# Patient Record
Sex: FEMALE | Race: WHITE | NOT HISPANIC OR LATINO | Employment: OTHER | URBAN - METROPOLITAN AREA
[De-identification: names, ages, dates, MRNs, and addresses within clinical notes are randomized per-mention and may not be internally consistent; named-entity substitution may affect disease eponyms.]

---

## 2018-07-05 ENCOUNTER — NEW PATIENT COMPREHENSIVE (OUTPATIENT)
Dept: URBAN - METROPOLITAN AREA CLINIC 36 | Facility: CLINIC | Age: 73
End: 2018-07-05

## 2018-07-05 DIAGNOSIS — H04.123: ICD-10-CM

## 2018-07-05 DIAGNOSIS — H26.491: ICD-10-CM

## 2018-07-05 PROCEDURE — G8427 DOCREV CUR MEDS BY ELIG CLIN: HCPCS

## 2018-07-05 PROCEDURE — 1036F TOBACCO NON-USER: CPT

## 2018-07-05 PROCEDURE — 99204 OFFICE O/P NEW MOD 45 MIN: CPT

## 2018-07-05 PROCEDURE — 4040F PNEUMOC VAC/ADMIN/RCVD: CPT

## 2018-07-05 PROCEDURE — G8482 FLU IMMUNIZE ORDER/ADMIN: HCPCS

## 2018-07-05 PROCEDURE — 92015 DETERMINE REFRACTIVE STATE: CPT

## 2018-07-05 ASSESSMENT — VISUAL ACUITY
OD_SC: J3
OS_CC: 20/50
OS_SC: 20/50
OS_CC: J3
OD_CC: J3
OD_CC: 20/40
OD_SC: 20/50+1
OS_SC: J3

## 2018-07-05 ASSESSMENT — TONOMETRY
OD_IOP_MMHG: 16
OS_IOP_MMHG: 18

## 2018-07-09 ENCOUNTER — RETINA CONSULT (OUTPATIENT)
Dept: URBAN - METROPOLITAN AREA CLINIC 36 | Facility: CLINIC | Age: 73
End: 2018-07-09

## 2018-07-09 VITALS — DIASTOLIC BLOOD PRESSURE: 78 MMHG | HEART RATE: 77 BPM | HEIGHT: 55 IN | SYSTOLIC BLOOD PRESSURE: 126 MMHG

## 2018-07-09 DIAGNOSIS — H35.363: ICD-10-CM

## 2018-07-09 DIAGNOSIS — H53.8: ICD-10-CM

## 2018-07-09 DIAGNOSIS — H35.52: ICD-10-CM

## 2018-07-09 DIAGNOSIS — H43.813: ICD-10-CM

## 2018-07-09 PROCEDURE — 9222550 BILAT EXTENDED OPHTHALMOSCOPY, FIRST

## 2018-07-09 PROCEDURE — 1036F TOBACCO NON-USER: CPT

## 2018-07-09 PROCEDURE — 92134 CPTRZ OPH DX IMG PST SGM RTA: CPT

## 2018-07-09 PROCEDURE — G8427 DOCREV CUR MEDS BY ELIG CLIN: HCPCS

## 2018-07-09 PROCEDURE — G8952 PRE-HTN/HTN, NO F/U, NOT GVN: HCPCS

## 2018-07-09 PROCEDURE — 92014 COMPRE OPH EXAM EST PT 1/>: CPT

## 2018-07-09 ASSESSMENT — TONOMETRY
OD_IOP_MMHG: 14
OS_IOP_MMHG: 18

## 2018-07-09 ASSESSMENT — VISUAL ACUITY
OD_SC: J3
OS_SC: J3
OS_CC: 20/40-2
OD_CC: 20/40+2

## 2018-07-18 ENCOUNTER — CONSULT (OUTPATIENT)
Dept: URBAN - METROPOLITAN AREA CLINIC 39 | Facility: CLINIC | Age: 73
End: 2018-07-18

## 2018-07-18 ENCOUNTER — SURGERY/PROCEDURE (OUTPATIENT)
Dept: URBAN - METROPOLITAN AREA SURGERY 14 | Facility: SURGERY | Age: 73
End: 2018-07-18

## 2018-07-18 VITALS
RESPIRATION RATE: 16 BRPM | SYSTOLIC BLOOD PRESSURE: 140 MMHG | HEART RATE: 65 BPM | DIASTOLIC BLOOD PRESSURE: 92 MMHG | HEIGHT: 55 IN

## 2018-07-18 DIAGNOSIS — H26.492: ICD-10-CM

## 2018-07-18 PROCEDURE — 92014 COMPRE OPH EXAM EST PT 1/>: CPT

## 2018-07-18 PROCEDURE — 66821 AFTER CATARACT LASER SURGERY: CPT

## 2018-07-18 PROCEDURE — 1036F TOBACCO NON-USER: CPT

## 2018-07-18 PROCEDURE — G8428 CUR MEDS NOT DOCUMENT: HCPCS

## 2018-07-18 PROCEDURE — G8785 BP SCRN NO PERF AT INTERVAL: HCPCS

## 2018-07-18 ASSESSMENT — VISUAL ACUITY
OD_SC: 20/50
OS_SC: J4
OS_SC: 20/70
OD_SC: J3
OS_PH: 20/50-2

## 2018-07-18 ASSESSMENT — TONOMETRY
OD_IOP_MMHG: 13
OS_IOP_MMHG: 16

## 2018-07-24 ENCOUNTER — SURGERY/PROCEDURE (OUTPATIENT)
Dept: URBAN - METROPOLITAN AREA SURGERY 14 | Facility: SURGERY | Age: 73
End: 2018-07-24

## 2018-07-24 ENCOUNTER — POST OP/EVAL OF SECOND EYE (OUTPATIENT)
Dept: URBAN - METROPOLITAN AREA CLINIC 39 | Facility: CLINIC | Age: 73
End: 2018-07-24

## 2018-07-24 DIAGNOSIS — H26.491: ICD-10-CM

## 2018-07-24 PROCEDURE — 99213 OFFICE O/P EST LOW 20 MIN: CPT

## 2018-07-24 PROCEDURE — 66821 AFTER CATARACT LASER SURGERY: CPT

## 2018-07-24 PROCEDURE — G8482 FLU IMMUNIZE ORDER/ADMIN: HCPCS

## 2018-07-24 PROCEDURE — G8428 CUR MEDS NOT DOCUMENT: HCPCS

## 2018-07-24 PROCEDURE — G8785 BP SCRN NO PERF AT INTERVAL: HCPCS

## 2018-07-24 PROCEDURE — 1036F TOBACCO NON-USER: CPT

## 2018-07-24 PROCEDURE — 4040F PNEUMOC VAC/ADMIN/RCVD: CPT

## 2018-07-24 ASSESSMENT — TONOMETRY
OS_IOP_MMHG: 13
OD_IOP_MMHG: 13

## 2018-07-24 ASSESSMENT — VISUAL ACUITY
OS_SC: 20/50
OD_SC: 20/50

## 2018-07-31 ENCOUNTER — POST-OP (OUTPATIENT)
Dept: URBAN - METROPOLITAN AREA CLINIC 36 | Facility: CLINIC | Age: 73
End: 2018-07-31

## 2018-07-31 DIAGNOSIS — Z98.890: ICD-10-CM

## 2018-07-31 DIAGNOSIS — H53.8: ICD-10-CM

## 2018-07-31 PROCEDURE — 99024 POSTOP FOLLOW-UP VISIT: CPT

## 2018-07-31 ASSESSMENT — VISUAL ACUITY
OD_PH: 20/30-2
OD_SC: J2-
OS_SC: 20/50
OS_PH: 20/40
OD_SC: 20/40-2
OS_SC: J3

## 2018-07-31 ASSESSMENT — TONOMETRY
OD_IOP_MMHG: 13
OS_IOP_MMHG: 15

## 2018-08-10 ENCOUNTER — DIAGNOSTICS ONLY (OUTPATIENT)
Dept: URBAN - METROPOLITAN AREA CLINIC 43 | Facility: CLINIC | Age: 73
End: 2018-08-10

## 2018-08-10 DIAGNOSIS — H53.8: ICD-10-CM

## 2018-08-10 DIAGNOSIS — H35.52: ICD-10-CM

## 2018-08-10 PROCEDURE — 92275 ELECTRORETINOGRAPHY: CPT

## 2019-08-01 ENCOUNTER — ESTABLISHED COMPREHENSIVE EXAM (OUTPATIENT)
Dept: URBAN - METROPOLITAN AREA CLINIC 36 | Facility: CLINIC | Age: 74
End: 2019-08-01

## 2019-08-01 DIAGNOSIS — H35.52: ICD-10-CM

## 2019-08-01 DIAGNOSIS — H04.123: ICD-10-CM

## 2019-08-01 DIAGNOSIS — H53.8: ICD-10-CM

## 2019-08-01 DIAGNOSIS — H52.7: ICD-10-CM

## 2019-08-01 PROCEDURE — 92015 DETERMINE REFRACTIVE STATE: CPT

## 2019-08-01 PROCEDURE — 92014 COMPRE OPH EXAM EST PT 1/>: CPT

## 2019-08-01 ASSESSMENT — TONOMETRY
OS_IOP_MMHG: 15
OD_IOP_MMHG: 14

## 2019-08-01 ASSESSMENT — VISUAL ACUITY
OS_SC: 20/50-2
OD_SC: J3
OD_SC: 20/50-2
OS_SC: J3

## 2019-09-13 NOTE — PATIENT DISCUSSION
BRANCH RETINAL VEIN OCCLUSION, OS:  CHRONIC MACULAR EDEMA; [address] PATIENT AND LEGAL GUARDIAN ELECT TO CONTINUE TO OBSERVE. RETURN AS SCHEDULED.

## 2020-10-29 ENCOUNTER — ESTABLISHED COMPREHENSIVE EXAM (OUTPATIENT)
Dept: URBAN - METROPOLITAN AREA CLINIC 36 | Facility: CLINIC | Age: 75
End: 2020-10-29

## 2020-10-29 DIAGNOSIS — H35.52: ICD-10-CM

## 2020-10-29 DIAGNOSIS — Z96.1: ICD-10-CM

## 2020-10-29 DIAGNOSIS — H52.7: ICD-10-CM

## 2020-10-29 DIAGNOSIS — Z98.890: ICD-10-CM

## 2020-10-29 DIAGNOSIS — H43.813: ICD-10-CM

## 2020-10-29 DIAGNOSIS — H04.123: ICD-10-CM

## 2020-10-29 DIAGNOSIS — H35.363: ICD-10-CM

## 2020-10-29 DIAGNOSIS — H55.00: ICD-10-CM

## 2020-10-29 PROCEDURE — 92015 DETERMINE REFRACTIVE STATE: CPT

## 2020-10-29 PROCEDURE — 92014 COMPRE OPH EXAM EST PT 1/>: CPT

## 2020-10-29 ASSESSMENT — TONOMETRY
OD_IOP_MMHG: 17
OS_IOP_MMHG: 16

## 2020-10-29 ASSESSMENT — VISUAL ACUITY
OD_SC: 20/50+2
OS_SC: 20/60+2
OS_SC: J6
OD_SC: J6

## 2024-12-31 ENCOUNTER — NEW PATIENT (OUTPATIENT)
Age: 79
End: 2024-12-31

## 2024-12-31 DIAGNOSIS — H52.7: ICD-10-CM

## 2024-12-31 DIAGNOSIS — H35.52: ICD-10-CM

## 2024-12-31 DIAGNOSIS — H55.00: ICD-10-CM

## 2024-12-31 DIAGNOSIS — H35.363: ICD-10-CM

## 2024-12-31 PROCEDURE — 92015 DETERMINE REFRACTIVE STATE: CPT

## 2024-12-31 PROCEDURE — 92004 COMPRE OPH EXAM NEW PT 1/>: CPT | Mod: 25

## 2024-12-31 PROCEDURE — 92134 CPTRZ OPH DX IMG PST SGM RTA: CPT
